# Patient Record
Sex: MALE | URBAN - METROPOLITAN AREA
[De-identification: names, ages, dates, MRNs, and addresses within clinical notes are randomized per-mention and may not be internally consistent; named-entity substitution may affect disease eponyms.]

---

## 2019-01-29 ENCOUNTER — APPOINTMENT (RX ONLY)
Dept: URBAN - METROPOLITAN AREA CLINIC 26 | Facility: CLINIC | Age: 74
Setting detail: DERMATOLOGY
End: 2019-01-29

## 2019-01-29 PROBLEM — Z85.820 PERSONAL HISTORY OF MALIGNANT MELANOMA OF SKIN: Status: ACTIVE | Noted: 2019-01-29

## 2019-01-29 PROBLEM — D48.5 NEOPLASM OF UNCERTAIN BEHAVIOR OF SKIN: Status: ACTIVE | Noted: 2019-01-29

## 2019-01-29 PROCEDURE — 11102 TANGNTL BX SKIN SINGLE LES: CPT

## 2019-01-29 PROCEDURE — ? BIOPSY BY SHAVE METHOD

## 2019-01-29 NOTE — PROCEDURE: BIOPSY BY SHAVE METHOD
Billing Type: Third-Party Bill
Biopsy Type: H and E
Electrodesiccation And Curettage Text: The wound bed was treated with electrodesiccation and curettage after the biopsy was performed.
X Size Of Lesion In Cm: 0
Type Of Destruction Used: Curettage
Bill 69077 For Specimen Handling/Conveyance To Laboratory?: no
Depth Of Biopsy: dermis
Electrodesiccation Text: The wound bed was treated with electrodesiccation after the biopsy was performed.
Size Of Lesion In Cm: 0.9
Wound Care: Vaseline
Anesthesia Type: 1% lidocaine with epinephrine
Lab Facility: [id number]
Cryotherapy Text: The wound bed was treated with cryotherapy after the biopsy was performed.
Consent: Written consent was obtained and risks were reviewed including but not limited to scarring, infection, bleeding, scabbing, incomplete removal, nerve damage and allergy to anesthesia.
Notification Instructions: Patient will be notified of biopsy results. However, patient instructed to call the office if not contacted within 2 weeks.
Lab: -17
Detail Level: Detailed
Biopsy Method: Dermablade
Curettage Text: The wound bed was treated with curettage after the biopsy was performed.
Was A Bandage Applied: Yes
Hemostasis: Aluminum Chloride
Silver Nitrate Text: The wound bed was treated with silver nitrate after the biopsy was performed.
Post-Care Instructions: I reviewed with the patient in detail post-care instructions. Patient is to keep the biopsy site dry overnight, and then apply bacitracin twice daily until healed. Patient may apply hydrogen peroxide soaks to remove any crusting.
Dressing: bandage
Anesthesia Volume In Cc (Will Not Render If 0): 0.5

## 2019-02-28 ENCOUNTER — APPOINTMENT (RX ONLY)
Dept: URBAN - METROPOLITAN AREA CLINIC 26 | Facility: CLINIC | Age: 74
Setting detail: DERMATOLOGY
End: 2019-02-28

## 2019-02-28 VITALS — HEART RATE: 66 BPM | SYSTOLIC BLOOD PRESSURE: 156 MMHG | DIASTOLIC BLOOD PRESSURE: 98 MMHG

## 2019-02-28 PROBLEM — C44.319 BASAL CELL CARCINOMA OF SKIN OF OTHER PARTS OF FACE: Status: ACTIVE | Noted: 2019-02-28

## 2019-02-28 PROCEDURE — 17312 MOHS ADDL STAGE: CPT

## 2019-02-28 PROCEDURE — ? MOHS SURGERY

## 2019-02-28 PROCEDURE — 13132 CMPLX RPR F/C/C/M/N/AX/G/H/F: CPT

## 2019-02-28 PROCEDURE — 17311 MOHS 1 STAGE H/N/HF/G: CPT

## 2024-09-16 ENCOUNTER — HOSPITAL ENCOUNTER (OUTPATIENT)
Dept: HOSPITAL 99 - CATH | Age: 79
Discharge: HOME | End: 2024-09-16
Payer: MEDICARE

## 2024-09-16 VITALS — SYSTOLIC BLOOD PRESSURE: 156 MMHG | DIASTOLIC BLOOD PRESSURE: 98 MMHG

## 2024-09-16 VITALS — SYSTOLIC BLOOD PRESSURE: 164 MMHG | DIASTOLIC BLOOD PRESSURE: 89 MMHG

## 2024-09-16 VITALS — SYSTOLIC BLOOD PRESSURE: 166 MMHG | DIASTOLIC BLOOD PRESSURE: 89 MMHG

## 2024-09-16 VITALS — SYSTOLIC BLOOD PRESSURE: 147 MMHG | DIASTOLIC BLOOD PRESSURE: 94 MMHG

## 2024-09-16 VITALS — DIASTOLIC BLOOD PRESSURE: 76 MMHG | SYSTOLIC BLOOD PRESSURE: 111 MMHG

## 2024-09-16 VITALS — DIASTOLIC BLOOD PRESSURE: 85 MMHG | SYSTOLIC BLOOD PRESSURE: 166 MMHG

## 2024-09-16 VITALS — DIASTOLIC BLOOD PRESSURE: 85 MMHG | RESPIRATION RATE: 20 BRPM | OXYGEN SATURATION: 2 %

## 2024-09-16 VITALS — SYSTOLIC BLOOD PRESSURE: 148 MMHG | DIASTOLIC BLOOD PRESSURE: 94 MMHG

## 2024-09-16 VITALS — SYSTOLIC BLOOD PRESSURE: 167 MMHG | DIASTOLIC BLOOD PRESSURE: 87 MMHG

## 2024-09-16 VITALS — BODY MASS INDEX: 28.7 KG/M2

## 2024-09-16 DIAGNOSIS — R06.02: ICD-10-CM

## 2024-09-16 DIAGNOSIS — I27.20: ICD-10-CM

## 2024-09-16 DIAGNOSIS — I25.10: Primary | ICD-10-CM

## 2024-09-16 DIAGNOSIS — J44.9: ICD-10-CM

## 2024-09-16 DIAGNOSIS — I25.82: ICD-10-CM

## 2024-09-16 DIAGNOSIS — R94.39: ICD-10-CM

## 2024-09-16 LAB
ACT BLD: > 397 SECONDS (ref 116–155)
ALBUMIN SERPL-MCNC: 4.1 G/DL (ref 3.5–5)
ALP SERPL-CCNC: 74 U/L (ref 38–126)
ALT SERPL-CCNC: 29 U/L (ref 0–50)
AST SERPL-CCNC: 43 U/L (ref 17–59)
BUN SERPL-MCNC: 19 MG/DL (ref 9–20)
CALCIUM SERPL-MCNC: 9.4 MG/DL (ref 8.4–10.2)
CHLORIDE SERPL-SCNC: 105 MMOL/L (ref 98–107)
CO2 SERPL-SCNC: 32 MMOL/L (ref 22–30)
EGFR: > 60
ERYTHROCYTE [DISTWIDTH] IN BLOOD BY AUTOMATED COUNT: 12.9 % (ref 11.5–14.5)
GLUCOSE SERPL-MCNC: 104 MG/DL (ref 70–99)
HCT VFR BLD AUTO: 44.3 % (ref 39–52)
HGB BLD-MCNC: 14.5 G/DL (ref 13–18)
MCHC RBC AUTO-ENTMCNC: 32.7 G/DL (ref 33–37)
MCV RBC AUTO: 93.7 FL (ref 80–94)
PLATELET # BLD AUTO: 163 10^3/UL (ref 130–400)
POTASSIUM SERPL-SCNC: 4.8 MMOL/L (ref 3.5–5.1)
PROT SERPL-MCNC: 6.4 G/DL (ref 6.3–8.2)
SODIUM SERPL-SCNC: 145 MMOL/L (ref 135–145)

## 2024-09-16 PROCEDURE — C1874 STENT, COATED/COV W/DEL SYS: HCPCS

## 2024-09-16 PROCEDURE — C1769 GUIDE WIRE: HCPCS

## 2024-09-16 PROCEDURE — C1894 INTRO/SHEATH, NON-LASER: HCPCS

## 2024-09-16 PROCEDURE — C9600 PERC DRUG-EL COR STENT SING: HCPCS

## 2024-09-16 PROCEDURE — C1753 CATH, INTRAVAS ULTRASOUND: HCPCS

## 2024-09-16 PROCEDURE — 92978 ENDOLUMINL IVUS OCT C 1ST: CPT

## 2024-09-16 PROCEDURE — C1725 CATH, TRANSLUMIN NON-LASER: HCPCS

## 2024-09-16 NOTE — W.PN.UPDATE
"Update Note"~"Progress Note Update"~"-: "~"Pt seen post LAD PCI JITENDRA. Radial cath site without ht, trace bleeding but successfully managed with manual compression. Post EKG NSR 70s, no acute changes. Pt understands importance of uninterrupted DAPT w/asa, plavix. Cardiac rehab consulted. "~"Followup at [facility] arranged. Home later today if cath site/tele remain stable. "

## 2024-09-16 NOTE — ITS.CL.ANGIO
"Cath Lab - Angioplasty"~"Angioplasty"~"Procedure Report: "~"CARDIAC CATHETERIZATION REPORT"~"Date of Procedure: 9/16/2024"~"Referring: Nathalie Meneses M.D."~"Indication: Worsening shortness of breath, abnormal stress test."~"PROCEDURE:"~"1. Right heart catheterization."~"2. Left heart catheterization."~"3. Coronary angiography."~"4.  Successful IFR of the proximal/mid LAD."~"5.  Successful IVUS guided PCI of the proximal/mid LAD."~"ACCESS:"~"6 South African right radial artery."~"5 South African right antecubital vein."~"CATHETERS:"~"1.  5 [place] African balloon wedge."~"2.  5 South African JL 3.5."~"3.  5 South African JR4."~"4.  6 [place] African EBU 3.5 guiding catheter."~"HEMODYNAMIC DATA"~"Weight (kg):		96.0"~"AO (s/d/x mmHg): 		148/74/104"~"LV (s/x mmHg): 		150/17"~"PCWP (a/v/x mmHg): 		Not obtained due to insufficient catheter length."~"PA (s/d/x mmHg): 		54/30/38"~"RV (s/x mmHg): 		54/15"~"RA (a/v/x mmHg): 		20/18/15"~"SVC SvO2 (%):		61.8"~"PA SvO2 (%):		67.9"~"SaO2 (%):			94.7"~"Hbg (g/dL):		14.2"~"CO (L/min): 		4.73"~"CI (L/min/m2): 		2.19"~"TPG (mmHg): 		21"~"PVR (Woods Units): 		4.44"~"SVR (dynes*seconds*cm^-5): 	1505"~"AVO2 Diff (Volume %): 	5.18"~"AV gradient (x, mmHg):	None."~"AV area (cm2):		Normal."~"LEFT VENTRICULOGRAPHY: 	Not performed."~"CORONARY ANGIOGRAPHY"~"Dominance: 		Right."~"Left Main: 			Normal size, bifurcating vessel.  There is no coronary artery disease."~"LAD: 			Normal size vessel giving rise to 2 significant diagonals.  There is a calcified, 50-60% lesion in the mid vessel, and the segment spanning the origins of D1 and D2."~"Ramus:			Congenitally absent."~"Circumflex: 		Normal size, nondominant vessel giving rise to 1 significant marginal.  There is a 40% lesion in the proximal third of OM1."~"RCA: 			Normal size, dominant vessel.  The artery is chronically totally occluded in its midsection.  The RPDA and Posterolateral arcade are supplied by collaterals from the LAD and circumflex systems."~"INTERVENTIONS"~"1.  Successful IFR of the 50-60% mid LAD lesion, demonstrating occlusive disease (IFR = 0.88)."~"2.  Successful IVUS guided PCI of the mid LAD lesion (Xience Skypoint 3.5 x 23 JITENDRA, postdilated with a 3.5 NC balloon throughout and 3.75 NC balloon in the proximal margin) with reduction in stenosis to 0%, maintaining JUAN-3 flow."~"Narrative:"~"Dr. Eastman was present throughout the entirety of the case and assisted me with various diagnostic portions of the procedure."~"The decision was made to perform physiologic testing. The diagnostic catheter was removed over a wire and exchanged for a(n) 6 [place] African EBU 3.5 guiding catheter.  The guiding catheter was advanced into the ascending aorta and seated in the left main "~"coronary artery.  Additional heparin was given to obtain an ACT greater than 250 seconds. An iFR wire was zeroed outside of the body, then inserted into the guiding sheath. The wire was advanced and the transducer was normalized just outside of the "~"guiding catheter tip. The wire was advanced into the distal LAD, beyond the 50-60% mid LAD lesion. Three iFR measurements were taken. The lesion was determined to be occlusive (0.88)."~"The decision was made to proceed with percutaneous coronary intervention. Additional heparin was given and a Power Turn Flex wire was advanced into the distal LAD. The occlusive, densely calcified 50-60% mid LAD lesion was predilated with a 2.0 x 12 "~"semi-compliant balloon to 12 dodie. The semi-compliant balloon was removed and a Xience Skypoint 3.5 x 23 drug-eluting stent was advanced. The stent was deployed at 12 atmospheres. The stent balloon was removed. A 3.5 x 20 noncompliant balloon was "~"advanced into the stent and the stent was postdilated to 14 atmospheres.  Angiography showed good stent expansion of the distal stent with some underexpansion in the proximal margin.  The decision was made to post dilate the proximal margin of the "~"stent more aggressively.  A 3.75 x 12 NC balloon was advanced.  The proximal margin of the stent was postdilated to 18 dodie, at which point the lesion yielded and a full balloon expansion was observed.  The noncompliant balloon was withdrawn."~"The decision was made to perform intracoronary imaging. An IVUS catheter was advanced through the guiding catheter and into the ostium of the artery. Ring down was performed once the imaging crystal was no longer inside of the guiding catheter. The "~"IVUS catheter was advanced into the mid LAD, beyond the stent. Intravascular ultrasound was performed in a retrograde fashion using a slow pullback. Intracoronary imaging demonstrated good stent apposition and expansion throughout the entire stented "~"segment.  There was no evidence of dissection or vessel compromise."~"The IVUS catheter was withdrawn.  Angiography was performed in orthogonal views, confirming good stent expansion and an excellent angiographic result. The coronary wire was withdrawn and the guide was disengaged from the artery.  The catheter was "~"removed over a standard J-wire."~"Closure Device: Vascular band for the right radial artery, manual pressure for the right antecubital vein."~"Radiation dose (mGy): 	664.74"~"DAP (cm2.Gy): 		57.8943"~"Fluoroscopy time (minutes):	15.5"~"Sedation time (minutes):	41"~"CONCLUSIONS: "~"1.  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  Mildly elevated filling pressures (LVEDP = 17 mmHg at 96.0 kg)."~"3.  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 Expectant management after cardiac catheterization via right radial/right antecubital approach."~"2. Limited weight bearing on the right wrist for one week."~"3.  Dual antiplatelet therapy with aspirin and clopidogrel for at least 12 months, followed by aspirin indefinitely."~"4.  Aggressive secondary prevention including high-dose, high potency statin."~"5.  Guideline directed medical therapy as hemodynamics will allow."~"6.  Follow-up with cardiology as well as pulmonology given his COPD and pulmonary hypertension."~"7.  Referral to cardiac rehab."~"Copy to: Nathalie Meneses M.D., LIYAH Lizama"~"Kory Kimbrough DO, FACC, FACP"

## 2025-01-22 NOTE — PROCEDURE: MOHS SURGERY
decreased endurance/impaired balance/impaired postural control/decreased ROM/decreased strength Alar Island Pedicle Flap Text: The defect edges were debeveled with a #15 scalpel blade.  Given the location of the defect, shape of the defect and the proximity to the alar rim an island pedicle advancement flap was deemed most appropriate.  Using a sterile surgical marker, an appropriate advancement flap was drawn incorporating the defect, outlining the appropriate donor tissue and placing the expected incisions within the nasal ala running parallel to the alar rim. The area thus outlined was incised with a #15 scalpel blade.  The skin margins were undermined minimally to an appropriate distance in all directions around the primary defect and laterally outward around the island pedicle utilizing iris scissors.  There was minimal undermining beneath the pedicle flap.

## 2025-04-03 ENCOUNTER — HOSPITAL ENCOUNTER (OUTPATIENT)
Dept: HOSPITAL 99 - HWRAD | Age: 80
End: 2025-04-03
Payer: COMMERCIAL

## 2025-04-03 DIAGNOSIS — J18.9: Primary | ICD-10-CM

## 2025-06-24 ENCOUNTER — HOSPITAL ENCOUNTER (OUTPATIENT)
Dept: HOSPITAL 99 - RADI | Age: 80
End: 2025-06-24
Payer: COMMERCIAL

## 2025-06-24 VITALS — SYSTOLIC BLOOD PRESSURE: 161 MMHG | DIASTOLIC BLOOD PRESSURE: 80 MMHG

## 2025-06-24 VITALS — SYSTOLIC BLOOD PRESSURE: 190 MMHG

## 2025-06-24 DIAGNOSIS — J90: Primary | ICD-10-CM

## 2025-06-24 LAB
BODY FLUID LDH: 210 U/L
BODY FLUID OTHER CELLS: (no result) %
BODY FLUID SECOND TECH: (no result)
GLUCOSE FLD-MCNC: 89 MG/DL
Lab: 1.1 %
Lab: 98.9 %
PH FLD: 7.45 [PH]
PROT FLD-MCNC: 3.5 G/DL
WBC # FLD AUTO: 3741 /CUMM

## 2025-06-24 PROCEDURE — 88305 TISSUE EXAM BY PATHOLOGIST: CPT
